# Patient Record
(demographics unavailable — no encounter records)

---

## 2018-04-19 NOTE — CARDIOLOGY REPORT
--------------- APPROVED REPORT --------------





EKG Measurement

Heart [id number]

RI [id number]

[id number]

[id number]

[id number]





Sinus tachycardia

Possible Left atrial enlargement

Left ventricular hypertrophy

Abnormal ECG

## 2018-04-19 NOTE — EMERGENCY ROOM REPORT
History of Present Illness


General


Chief Complaint:  Chest Pain


Source:  Patient





Present Illness


HPI


Is a 42-year-old male who has been here for chest pain.  He has a history of 

opioid dependence and has been to multiple hospitals for chest pain.  He 

presents with chief complaint abdominal pain and chest pain.  Onset for last 2 

days.  Pain is 10 out of 10.  No nausea no vomiting.  No radiation.  No 

diaphoresis.  Similar pain in the past.  He is asking for Dilaudid.  Said that 

he needed every 4 hours.


Allergies:  


Coded Allergies:  


     HYDRALAZINE (Verified  Allergy, Severe, angio edema, 8/3/16)


 angio edema


     ETODOLAC (Unverified  Allergy, Mild, 4/26/16)


     AMLODIPINE (Unverified  Allergy, Unknown, 8/3/16)


 tolerates nifedipine 8/3/16


     ASPIRIN (Verified  Allergy, Unknown, 4/25/16)


     ATENOLOL (Verified  Allergy, Unknown, 8/3/16)


 tolerates metoprolol 8/3/16


     DIPHENHYDRAMINE (Verified  Allergy, Unknown, 4/26/16)


     HYDROCHLOROTHIAZIDE (Verified  Allergy, Unknown, 4/26/16)


     LABETALOL (Verified  Allergy, Unknown, 8/3/16)


 tolerates metoprolol 8/3/16


     LISINOPRIL (Unverified  Allergy, Unknown, 4/26/16)


     LORATADINE (Verified  Allergy, Unknown, 4/26/16)


     MILK (Verified  Allergy, Unknown, 4/26/16)


     MORPHINE (Verified  Allergy, Unknown, 4/25/16)


     NICARDIPINE (Verified  Allergy, Unknown, 8/3/16)


 tolerates nifedipine 8/3/16


     NITROGLYCERIN (Unverified  Allergy, Unknown, 4/26/16)


     PENICILLINS (Unverified  Allergy, Unknown, 8/3/16)


 tolerates cephalexin 8/3/16


     [place] Nut (Verified  Allergy, Unknown, 4/26/16)


     SHELLFISH DERIVED (Verified  Allergy, Unknown, 4/26/16)





Patient History


Past Medical History:  see triage record, old chart reviewed, HTN


Past Surgical History:  other


Pertinent Family History:  none


Social History:  Denies: smoking


Immunizations:  other


Reviewed Nursing Documentation:  PMH: Agreed; PSxH: Agreed





Nursing Documentation-PMH


Hx Cardiac Problems:  Yes


Hx Hypertension:  Yes


Hx Diabetes:  Yes


Hx Cancer:  No


Hx Gastrointestinal Problems:  No


History Of Psychiatric Problem:  Yes - anxiety


Hx Neurological Problems:  Yes - spinal stenosis





Review of Systems


Eye:  Denies: eye pain, blurred vision


ENT:  Denies: ear pain, nose congestion, throat swelling


Respiratory:  Denies: cough, shortness of breath


Cardiovascular:  Reports: chest pain; Denies: palpitations


Gastrointestinal:  Reports: abdominal pain; Denies: diarrhea, nausea, vomiting


Musculoskeletal:  Denies: back pain, joint pain


Skin:  Denies: rash


Neurological:  Denies: headache, numbness


Endocrine:  Denies: increased thirst, increased urine


Hematologic/Lymphatic:  Denies: easy bruising


All Other Systems:  negative except mentioned in HPI





Physical Exam





Vital Signs








  Date Time  Temp Pulse Resp B/P (MAP) Pulse Ox O2 Delivery O2 Flow Rate FiO2


 


4/18/18 23:41  118 20 198/113 99 Room Air  





vitals with tachycardia and hypertension


Sp02 EP Interpretation:  reviewed, normal


General Appearance:  well appearing, no apparent distress, alert


Head:  normocephalic, atraumatic


Eyes:  bilateral eye PERRL, bilateral eye EOMI


ENT:  hearing grossly normal, normal pharynx


Neck:  full range of motion, supple, no meningismus


Respiratory:  chest non-tender, lungs clear, normal breath sounds


Cardiovascular #1:  regular rate, rhythm, no murmur


Gastrointestinal:  normal bowel sounds, non tender, no mass, no organomegaly, 

no bruit, non-distended


Musculoskeletal:  back normal, gait/station normal, normal range of motion


Psychiatric:  mood/affect normal


Skin:  warm/dry





Medical Decision Making


Diagnostic Impression:  


 Primary Impression:  


 Chest pain


 Qualified Codes:  R07.9 - Chest pain, unspecified


 Additional Impressions:  


 Noncompliance w/medication treatment due to intermit use ofmedication


 Opioid dependence


 Qualified Codes:  F11.20 - Opioid dependence, uncomplicated


 Hypertension


 Qualified Codes:  I10 - Essential (primary) hypertension


 Abdominal pain


 Qualified Codes:  R10.13 - Epigastric pain


 Uncontrolled diabetes mellitus


 Qualified Codes:  E11.8 - Type 2 diabetes mellitus with unspecified 

complications; E11.65 - Type 2 diabetes mellitus with hyperglycemia


ER Course


Patient presents with chest pain and abdominal pain.  He has numerous allergies 

to medication.  He said that nitroglycerin give him hypertension.  He said 

Benadryl give him itching.  He said he can't take aspirin and can only take 

Dilaudid.  I seen him in the past with the same reason patient.  On the 

[facility] system, he had recent Dilaudid, oxycodone, and hydrocodone 

prescription.  He has 3 different addresses including [place], [place], 

and in [facility].  When I point this out, he became very agitated and said 

that he had to major surgery within the last couple months.  He said that he 

had abdominal surgery and also heart surgery.  He has no scar on his body.  He 

said that he doesn't know what kind of surgery.  He said I can call the 

Hospital in [place] if I want to know.  He also is claimed that he has Dilaudid 

and his bag but wanted IV Dilaudid





Because he would not get his Dilaudid, he signed out AMA.  He is competent to 

make that decision.


Lab Results Impression


lab with elevated glucose


EKG Diagnostic Results


Rate:  tachycardiac


Rhythm:  NSR


ST Segments:  other - NSST changes





Rhythm Strip Diag. Results


Rhythm Strip Time:  01:21


EP Interpretation:  yes


Rate:  100


Rhythm:  NSR, no PVC's, no ectopy





Chest X-Ray Diagnostic Results


Chest X-Ray Diagnostic Results :  


   Chest X-Ray Ordered:  Yes


   # of Views/Limited/Complete:  1 View


   Indication:  Chest Pain


   EP Interpretation:  Yes


   Interpretation:  no consolidation, no effusion, no pneumothorax, no acute 

cardiopulmonary disease


   Impression:  No acute disease - Cardiomegaly


   Electronically Signed by:  Aldo Atwood MD





Last Vital Signs








  Date Time  Temp Pulse Resp B/P (MAP) Pulse Ox O2 Delivery O2 Flow Rate FiO2


 


4/18/18 23:41  118 20 198/113 99 Room Air  








Status:  improved


Disposition:  AGAINST MEDICAL ADVICE


Condition:  Stable


Referrals:  


NOT CHOSEN IPA/MD,REFERRING (PCP)











ALDO ATWOOD M.D. Apr 19, 2018 01:22

## 2018-04-19 NOTE — DIAGNOSTIC IMAGING REPORT
Indication: Chest pain

 

Technique: One view of the chest

 

Comparison: 7/31/2016

 

Findings: Lungs and pleural spaces are clear. Heart size is normal

 

Impression: No acute process
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
## 2018-04-28 NOTE — DIAGNOSTIC IMAGING REPORT
Indication: Dyspnea

 

Comparison:  4/19/2018

 

A single view chest radiograph was obtained.

 

Findings:

 

Cardiomediastinal appearance is within normal limits for age.  Pulmonary vascularity

is appropriate. The diaphragmatic contour is smooth and costophrenic angles are

sharp. No pleural effusions are identified. The bones are unremarkable.

 

Impression: No acute findings

## 2019-04-05 NOTE — NUR
-------------------------------------------------------------------------------

            *** Note [id number] in EDM - 04/05/19 at 1238 by CHELITA ***             

-------------------------------------------------------------------------------

ER DISCHARGE NOTE:

Patient is cleared to be discharged per ERMD, pt is aox4, on room air, with 
stable vital signs. pt was given dc instructions, pt was able to verbalize 
understanding, pt id band removed. pt is able to ambulate with steady gait. pt 
took all belongings.

## 2019-04-05 NOTE — NUR
ED Nurse Note:

pt cleared to be discharge by Dr. Aguilar and Dr. Last.  pt wants to take a 
shower before discharge.  RN instructed pt that no available staff and 
available shower facility at the ED.  RN provide wash cloth and tooth brush 
with tooth paste.  PT request razor and RN explained to pt that no razor at 
this time.  will wait until pt get dress up.

## 2019-04-05 NOTE — NUR
ER Nurse Note:



Pt BIBA [address] from [address] c/o behavior complaint; pt stated he feel depressed 
because today is his mother's death anniversary, also feeling lost and does not 
have "a direction in life". Pt rambles incoherent statements and repeats them. 
Pt calm, VSS elevated, no signs of distress. All belongings taken and placed in 
locker 2. ERMD at pt side; will continue to [facility] [place].

## 2019-04-05 NOTE — NUR
ED Nurse Note:

Patient moved from monitored bed to ortho. Patient has letter from psychiatric 
facility that he believes confirms why he is to be in a private room. Copied 
made and placed in chart. Patient agreed to take GI cocktail and pain 
medication. will continue to monitor.

## 2019-04-05 NOTE — NUR
ED Nurse Note:

Reports received.  pt doing yoga on the floor.  respirations even and 
non-labored noted.  skin warm to touch. no open wound noted.  AAO x 4.  denies 
SI/HI.  denies hearing voices or seeing any things.  pt felt depressed due to 
yesterday was mom's b-day.  ordered breakfast tray.  will wait for the Dr. Aguilar for eval.

## 2019-04-05 NOTE — NUR
ER Nurse Note:



Pt is medically cleared and waiting for psych in the AM. Pt stated he can only 
take diludad, fentynl, and norco for his pain management. Pt has not complained 
of pain throughout shift. Pt is upset and refusing to be relocated to a tx 1. 
Pt stated he cannot have a shared room per his psychiatrist orders. All meds 
given per ERMD orders. Report given to SARAH Qureshi.

## 2019-04-05 NOTE — EMERGENCY ROOM REPORT
History of Present Illness


General


Chief Complaint:  Behavioral Complaint


Source:  Patient, EMS


 (Jorge Luis Hill DO)





Present Illness


HPI


Patient brought in by paramedics


Reports of feeling depressed


Patient reports that it was his mother's birthday who has passed away





After initial minimal discussion patient has fall sleep





History of present illness remains somewhat limited





Patient denied any plans of suicide prior to falling asleep





Denies any chest pain denies any vomiting


 (Jorge Luis Hill DO)


Allergies:  


Coded Allergies:  


     HYDRALAZINE (Verified  Allergy, Severe, angio edema, 4/28/18)


 angio edema


     ETODOLAC (Unverified  Allergy, Mild, 4/28/18)


     AMLODIPINE (Unverified  Allergy, Unknown, 4/28/18)


 tolerates nifedipine 8/3/16


     ASPIRIN (Verified  Allergy, Unknown, 4/28/18)


     ATENOLOL (Verified  Allergy, Unknown, 4/28/18)


 tolerates metoprolol 8/3/16


     DIPHENHYDRAMINE (Verified  Allergy, Unknown, 4/28/18)


     HYDROCHLOROTHIAZIDE (Verified  Allergy, Unknown, 4/28/18)


     LABETALOL (Verified  Allergy, Unknown, 4/28/18)


 tolerates metoprolol 8/3/16


     LISINOPRIL (Unverified  Allergy, Unknown, 4/26/16)


     LORATADINE (Verified  Allergy, Unknown, 4/28/18)


     MILK (Verified  Allergy, Unknown, 4/28/18)


     MORPHINE (Verified  Allergy, Unknown, 4/28/18)


     NICARDIPINE (Verified  Allergy, Unknown, 4/28/18)


 tolerates nifedipine 8/3/16


     NITROGLYCERIN (Unverified  Allergy, Unknown, 4/28/18)


     PENICILLINS (Unverified  Allergy, Unknown, 4/28/18)


 tolerates cephalexin 8/3/16


     Holly Nut (Verified  Allergy, Unknown, 4/28/18)


     SHELLFISH DERIVED (Verified  Allergy, Unknown, 4/28/18)





Patient History


Limited by:  medical condition


Past Medical History:  see triage record


Pertinent Family History:  unable to obtain


Reviewed Nursing Documentation:  PMH: Agreed; PSxH: Agreed (Jorge Luis Hill DO)





Nursing Documentation-PMH


Past Medical History:  No History, Except For


Hx Cardiac Problems:  Yes - loop recorder placed in 2019


Hx Hypertension:  Yes


Hx Diabetes:  Yes


Hx Cancer:  No


Hx Gastrointestinal Problems:  Yes - peptic ulcer


Hx Dialysis:  No - spinal stenosis


Hx Neurological Problems:  Yes - spinal stenosis


 (Jorge Luis Hill DO)





Review of Systems


All Other Systems:  limited - Other than the ones mentioned in the history of 

present illness all others are reviewed however they do stay limited due to the 

patient's mental status


 (Jorge Luis Hill DO)





Physical Exam





Vital Signs








  Date Time  Temp Pulse Resp B/P (MAP) Pulse Ox O2 Delivery O2 Flow Rate FiO2


 


4/5/19 00:00 99.0 140 20 178/111 95 Room Air  








Sp02 EP Interpretation:  reviewed, normal


General Appearance:  no apparent distress


Head:  normocephalic, atraumatic


Eyes:  bilateral eye PERRL, bilateral eye EOMI


ENT:  hearing grossly normal, normal pharynx, TMs + canals normal, uvula midline


Neck:  full range of motion, supple, no meningismus, no bony tend


Respiratory:  lungs clear, normal breath sounds, no rhonchi, no respiratory 

distress, no retraction, no accessory muscle use


Cardiovascular #1:  normal peripheral pulses, regular rate, rhythm, no edema, 

no gallop, no JVD, no murmur


Gastrointestinal:  normal bowel sounds, non tender, soft, no mass, no 

organomegaly, non-distended, no guarding, no hernia, no pulsatile mass, no 

rebound


Genitourinary:  no CVA tenderness


Musculoskeletal:  normal inspection


Neurologic:  responsive, motor strength/tone normal, sensory intact


Psychiatric:  mood/affect normal


Skin:  normal color, no rash, warm/dry, palpation normal


Lymphatic:  normal inspection, no adenopathy


 (Jorge Luis Hill DO)





Medical Decision Making


Diagnostic Impression:  


 Primary Impression:  


 Depression


ER Course


Multiple differentials and consideration patient has initial blood work for 

further medical clearance patient's white blood cell count


Is low consistent with multiple previous examinations





Otherwise remains calm and restful throughout the night





Given the patient's complaints and presentation I feel he will require 

psychiatric consultation who will be contacted in the morning








After initial observation patient, had complained of epigastric discomfort 

reports has a history of peptic ulcer disease


Patient also requesting Dilaudid





Reports that he cannot take Tylenol however, he can take Norco





Patient also reports that he has been seen by multiple medical professionals, 

he also does not feel that he has been getting the appropriate answers from 

different psychiatrist.  





Patient's otherwise medically further cleared





Labs








Test


  4/5/19


00:35 4/5/19


03:35


 


White Blood Count


  3.0 K/UL


(4.8-10.8) 


 


 


Red Blood Count


  4.61 M/UL


(4.70-6.10) 


 


 


Hemoglobin


  13.5 G/DL


(14.2-18.0) 


 


 


Hematocrit


  41.7 %


(42.0-52.0) 


 


 


Mean Corpuscular Volume 91 FL (80-99)  


 


Mean Corpuscular Hemoglobin


  29.2 PG


(27.0-31.0) 


 


 


Mean Corpuscular Hemoglobin


Concent 32.3 G/DL


(32.0-36.0) 


 


 


Red Cell Distribution Width


  14.8 %


(11.6-14.8) 


 


 


Platelet Count


  261 K/UL


(150-450) 


 


 


Mean Platelet Volume


  7.1 FL


(6.5-10.1) 


 


 


Neutrophils (%) (Auto)  % (45.0-75.0)  


 


Lymphocytes (%) (Auto)  % (20.0-45.0)  


 


Monocytes (%) (Auto)  % (1.0-10.0)  


 


Eosinophils (%) (Auto)  % (0.0-3.0)  


 


Basophils (%) (Auto)  % (0.0-2.0)  


 


Differential Total Cells


Counted 100 


  


 


 


Neutrophils % (Manual) 58 % (45-75)  


 


Lymphocytes % (Manual) 33 % (20-45)  


 


Monocytes % (Manual) 6 % (1-10)  


 


Eosinophils % (Manual) 1 % (0-3)  


 


Basophils % (Manual) 2 % (0-2)  


 


Band Neutrophils 0 % (0-8)  


 


Platelet Estimate Adequate  


 


Platelet Morphology Normal  


 


Anisocytosis 1+  


 


Ovalocytes 1+  


 


Sodium Level


  141 MMOL/L


(136-145) 


 


 


Potassium Level


  5.1 MMOL/L


(3.5-5.1) 


 


 


Chloride Level


  103 MMOL/L


() 


 


 


Carbon Dioxide Level


  27 MMOL/L


(21-32) 


 


 


Anion Gap


  11 mmol/L


(5-15) 


 


 


Blood Urea Nitrogen


  15 mg/dL


(7-18) 


 


 


Creatinine


  1.0 MG/DL


(0.55-1.30) 


 


 


Estimat Glomerular Filtration


Rate > 60 mL/min


(>60) 


 


 


Glucose Level


  189 MG/DL


() 


 


 


Calcium Level


  9.3 MG/DL


(8.5-10.1) 


 


 


Total Bilirubin


  0.6 MG/DL


(0.2-1.0) 


 


 


Aspartate Amino Transf


(AST/SGOT) 73 U/L (15-37) 


  


 


 


Alanine Aminotransferase


(ALT/SGPT) 45 U/L (12-78) 


  


 


 


Alkaline Phosphatase


  65 U/L


() 


 


 


Troponin I


  0.032 ng/mL


(0.000-0.056) 


 


 


Total Protein


  8.0 G/DL


(6.4-8.2) 


 


 


Albumin


  4.0 G/DL


(3.4-5.0) 


 


 


Globulin 4.0 g/dL  


 


Albumin/Globulin Ratio 1.0 (1.0-2.7)  


 


Salicylates Level


  2.2 ug/mL


(2.8-20) 


 


 


Acetaminophen Level


  < 2 MCG/ML


(10-30) 


 


 


Serum Alcohol < 3 mg/dL  


 


Urine Opiates Screen


  


  Negative


(NEGATIVE)


 


Urine Barbiturates Screen


  


  Negative


(NEGATIVE)


 


Phencyclidine (PCP) Screen


  


  Negative


(NEGATIVE)


 


Urine Amphetamines Screen


  


  Positive


(NEGATIVE)


 


Urine Benzodiazepines Screen


  


  Positive


(NEGATIVE)


 


Urine Cocaine Screen


  


  Negative


(NEGATIVE)


 


Urine Marijuana (THC) Screen


  


  Negative


(NEGATIVE)








 (Jorge Luis Hill DO)


ER Course


I discussed this case with Dr. gAuilar and she felt there is no need for 

inpatient evaluation.  She thinks he has a personality disorder, but is not 

suicidal or psychotic currently.  Will dc home.  Patient seeking pain meds for 

his chronic back pain, requesting Dilaudid and oxycodone.


 (STEPHANIE DURON M.D)





Rhythm Strip Diag. Results


EP Interpretation:  yes


Rate:  70


Rhythm:  NSR, no PVC's, no ectopy


 (Jorge Luis Hill DO)





Last Vital Signs








  Date Time  Temp Pulse Resp B/P (MAP) Pulse Ox O2 Delivery O2 Flow Rate FiO2


 


4/5/19 01:31 99.0 110 20 144/81 98 Room Air  








Status:  improved


 (Jorge Luis Hill DO)


Referrals:  


NOT CHOSEN IPA/MD,REFERRING (PCP)











Jorge Luis Hill DO Apr 5, 2019 03:09


STEPHANIE DURON M.D Apr 5, 2019 10:37

## 2019-04-05 NOTE — NUR
ER Nurse Note:



Pt has not provided urine; all other labs sent and awaiting results. Pt asleep, 
IV infusing NS, /81. All safety measures met; will continue to montior.
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
